# Patient Record
Sex: FEMALE | Race: WHITE
[De-identification: names, ages, dates, MRNs, and addresses within clinical notes are randomized per-mention and may not be internally consistent; named-entity substitution may affect disease eponyms.]

---

## 2018-11-04 ENCOUNTER — HOSPITAL ENCOUNTER (EMERGENCY)
Dept: HOSPITAL 97 - ER | Age: 28
Discharge: HOME | End: 2018-11-04
Payer: SELF-PAY

## 2018-11-04 VITALS — TEMPERATURE: 97.9 F

## 2018-11-04 VITALS — OXYGEN SATURATION: 98 % | SYSTOLIC BLOOD PRESSURE: 102 MMHG | DIASTOLIC BLOOD PRESSURE: 87 MMHG

## 2018-11-04 DIAGNOSIS — J45.901: Primary | ICD-10-CM

## 2018-11-04 PROCEDURE — 94640 AIRWAY INHALATION TREATMENT: CPT

## 2018-11-04 PROCEDURE — 99284 EMERGENCY DEPT VISIT MOD MDM: CPT
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
## 2018-11-04 NOTE — EDPHYS
Physician Documentation                                                                           

 [facility]                                                                

Name: Lopez Oquendo                                                                               

Age: 28 yrs                                                                                       

Sex: Female                                                                                       

: 1990                                                                                   

MRN: [id number]                                                                                   

Arrival Date: 2018                                                                          

Time: 06:36                                                                                       

Account#: [id number]                                                                            

Bed 6                                                                                             

Private MD:                                                                                       

ED Physician Olvin Yousif                                                                       

HPI:                                                                                              

                                                                                             

07:00 This 28 yrs old  Female presents to ER via Ambulatory with complaints of       cp  

      Breathing Difficulty, Asthma Exacerbation.                                                  

07:00 The patient has shortness of breath at rest.                                            cp  

07:00 Onset: The symptoms/episode began/occurred 3 day(s) ago.                                cp  

07:00 Duration: The symptoms are continuous, and are steadily getting worse. Associated signs cp  

      and symptoms: Pertinent negatives: chest pain, productive cough, fever, vomiting.           

07:00 Severity of symptoms: in the emergency department the symptoms are unchanged despite    cp  

      home interventions.                                                                         

                                                                                                  

OB/GYN:                                                                                           

06:52 LMP 2018                                                                           ea  

                                                                                                  

Historical:                                                                                       

- Allergies:                                                                                      

06:52 No Known Allergies;                                                                     ea  

- Home Meds:                                                                                      

06:52 Advair Diskus Inhl [Active]; Flovent Inhl [Active]; Albuterol Nebulizer [Active];       ea  

- PMHx:                                                                                           

06:52 Asthma;                                                                                 ea  

- PSHx:                                                                                           

06:52 None;                                                                                   ea  

                                                                                                  

- Immunization history:: Adult Immunizations up to date.                                          

- Social history:: Smoking status: Patient/guardian denies using tobacco.                         

- Ebola Screening: : No symptoms or risks identified at this time.                                

                                                                                                  

                                                                                                  

ROS:                                                                                              

07:05 Constitutional: Negative for body aches, chills, fever, poor PO intake.                 cp  

07:05 Eyes: Negative for injury, pain, redness, and discharge.                                cp  

07:05 ENT: Negative for drainage from ear(s), ear pain, sore throat, difficulty swallowing,       

      difficulty handling secretions.                                                             

07:05 Cardiovascular: Negative for chest pain, edema, palpitations.                               

07:05 Respiratory: Positive for cough, with no reported sputum, shortness of breath, wheezing.    

07:05 Abdomen/GI: Negative for abdominal pain, nausea, vomiting, and diarrhea.                    

07:05 Back: Negative for pain at rest, pain with movement, radiated pain.                         

07:05 : Negative for urinary symptoms.                                                          

07:05 Skin: Negative for cellulitis, rash.                                                        

07:05 Neuro: Negative for altered mental status, dizziness, headache, syncope, near syncope,      

      weakness.                                                                                   

07:05 All other systems are negative.                                                             

                                                                                                  

Exam:                                                                                             

07:10 Constitutional: The patient appears in no acute distress, alert, awake, non-toxic, well cp  

      developed, well nourished.                                                                  

07:10 Head/Face:  Normocephalic, atraumatic. Eyes:  Pupils equal round and reactive to light, cp  

      extra-ocular motions intact.  Lids and lashes normal.  Conjunctiva and sclera are           

      non-icteric and not injected.  Cornea within normal limits.  Periorbital areas with no      

      swelling, redness, or edema. ENT:  Nares patent. No nasal discharge, no septal              

      abnormalities noted.  Tympanic membranes are normal and external auditory canals are        

      clear.  Oropharynx with no redness, swelling, or masses, exudates, or evidence of           

      obstruction, uvula midline.  Mucous membranes moist. Neck:  Trachea midline, no             

      thyromegaly or masses palpated, and no cervical lymphadenopathy.  Supple, full range of     

      motion without nuchal rigidity, or vertebral point tenderness.  No Meningismus.             

      Chest/axilla:  Normal chest wall appearance and motion.  Nontender with no deformity.       

      No lesions are appreciated.                                                                 

07:10 Cardiovascular: Rate: tachycardic, Rhythm: regular, Heart sounds: murmur, not               

      appreciated, Edema: is not appreciated.                                                     

07:10 Respiratory: the patient does not display signs of respiratory distress,  Respirations:     

      labored breathing, is not present, intercostal retractions, are absent, splinting, is       

      not noted, tachypnea, is not appreciated, Breath sounds: decreased breath sounds, are       

      not appreciated, stridor, is not appreciated, wheezing: that is mild, is heard              

      diffusely.                                                                                  

07:10 Abdomen/GI: Exam negative for discomfort, distension, guarding, Inspection: abdomen         

      appears normal.                                                                             

07:10 Back: pain, is absent, ROM is normal.                                                       

07:10 Skin: cellulitis, is not appreciated, no rash present.                                      

07:10 Neuro: Orientation: to person, place \T\ time. Mentation: is normal, Cerebellar function:   

      is grossly normal, Motor: moves all fours, strength is normal, Sensation: no obvious        

      gross deficits, Gait: is steady, at a normal pace, without difficulty.                      

                                                                                                  

Vital Signs:                                                                                      

06:52  / 93; Pulse 107; Resp 22; Temp 97.9(O); Pulse Ox 97% ; Weight 56.7 kg; Height 5  ea  

      ft. (152.40 cm); Pain 0/10;                                                                 

08:00  / 84; Pulse 99; Resp 17; Pulse Ox 96% on R/A;                                    tw2 

08:45  / 87; Pulse 89; Resp 20; Pulse Ox 98% on R/A;                                    ph  

06:52 Body Mass Index 24.41 (56.70 kg, 152.40 cm)                                             ea  

                                                                                                  

MDM:                                                                                              

06:52 Patient medically screened.                                                             cp  

07:00 Differential diagnosis: asthma, Bronchitis Chronic Obstructive Pulmonary Disease        cp  

      pneumonia, Pneumothorax pulmonary edema, Pulmonary Embolism reactive airway disease.        

08:39 Data reviewed: vital signs, nurses notes, and as a result, I will discharge patient.    cp  

08:39 Response to treatment: the patient's symptoms have markedly improved after treatment,   cp  

      VSS. Wheezing resolved and cough improved, and as a result, I will discharge patient.       

                                                                                                  

Administered Medications:                                                                         

07:07 Drug: predniSONE 10 mg Route: PO;                                                       rb1 

08:51 Follow up: Response: No adverse reaction; Marked relief of symptoms                     ph  

07:08 Drug: Albuterol - atroVENT (3:1) (2.5 mg - 0.5 mg) 3 ml Route: Nebulizer;               rb1 

08:51 Follow up: Response: No adverse reaction; Marked relief of symptoms; Wheezing diminishedph  

07:08 Drug: predniSONE 40 mg Route: PO;                                                       rb1 

08:51 Follow up: Response: No adverse reaction; Marked relief of symptoms                     ph  

                                                                                                  

                                                                                                  

Disposition:                                                                                      

10:48 Co-signature as Attending Physician, Olvin Yousif MD I agree with the assessment and   kdr 

      plan of care.                                                                               

                                                                                                  

Disposition:                                                                                      

18 08:45 Discharged to Home. Impression: Unspecified asthma with (acute) exacerbation.      

- Condition is Stable.                                                                            

- Discharge Instructions: Asthma, Adult.                                                          

- Prescriptions for Advair HFA 45- 21 mcg/actuation Inhalation HFA aerosol inhaler -              

  inhale 2 puff by INHALATION route 2 times per day; 1 Inhaler. Albuterol Sulfate 2.5             

  mg /3 mL (0.083 %) Inhalation Solution for Nebulization - inhale 1 unit by                      

  NEBULIZATION route every 8 hours As needed; 1 box. Prednisone 20 mg Oral Tablet -               

  take 2 tablet by ORAL route once daily for 5 days; 10 tablet. Albuterol Sulfate 90              

  mcg/actuation - inhale 1-2 puff by INHALATION route every 4-6 hours; 1 Inhaler.                 

- Medication Reconciliation Form, Thank You Letter, Antibiotic Education, Prescription            

  Opioid Use form.                                                                                

- Follow up: Private Physician; When: 1 - 2 days; Reason: Recheck today's complaints.             

- Problem is an acute exacerbation.                                                               

- Symptoms have improved.                                                                         

                                                                                                  

                                                                                                  

                                                                                                  

Signatures:                                                                                       

Olvin Yousif MD MD kdr Ballard, Brenda, RN                     RN   bb                                                   

Joan Guerra RN RN   ph                                                   

Bart Morel PA PA cp Barber, Rebecca, RN                     RN   [facility]                                                  

Lucrecia Dumont RN RN ea                                                   

                                                                                                  

Corrections: (The following items were deleted from the chart)                                    

08:54 08:45 2018 08:45 Discharged to Home. Impression: Unspecified asthma with (acute)  ph  

      exacerbation. Condition is Stable. Forms are Medication Reconciliation Form, Thank You      

      Letter, Antibiotic Education, Prescription Opioid Use. Follow up: Private Physician;        

      When: 1 - 2 days; Reason: Recheck today's complaints. Problem is an acute exacerbation.     

      Symptoms have improved. cp                                                                  

                                                                                                  

**************************************************************************************************

## 2018-11-04 NOTE — XMS REPORT
[facility]

 Created on:2018



Patient:Lopez Oquendo

Sex:Female

:1990

External Reference #:[id number]





Demographics







 Address  [address] [place] [address]

 

 Phone  [phone]

 

 Preferred Language  en

 

 Marital Status  Unknown

 

 Baptism Affiliation  Unknown

 

 Race  White

 

 Ethnic Group  Unknown









Author







 Organization  [employer]









Care Team Providers







 Name  Role  Phone

 

 Braeden Stephenson  Provider Role  Unavailable









Allergies

No Known Allergies



Problems







 Problem Type  Condition  Code  Onset Dates  Condition Status

 

 Problem  Asthma, unspecified asthma  J45.909    Active



   severity, unspecified whether      



   complicated, unspecified whether      



   persistent      

 

 Problem  Seasonal allergies  J30.2    Active







Medications

No Known Medications



Results

No Known Results



Summary Purpose

[employer] Submission

## 2018-11-04 NOTE — XMS REPORT
Clinical Summary

 Created on:2018



Patient:Lopez Oquendo

Sex:Female

:1990

External Reference #:[id number]





Demographics







 Address  [address]



   [place] [address]

 

 Mobile Phone  [phone]

 

 Home Phone  [phone]

 

 Email Address  [email or web address]

 

 Preferred Language  English

 

 Marital Status  Single

 

 Temple Affiliation  Unknown

 

 Race  White

 

 Ethnic Group  Not  or 









Author







 Organization  [facility]

 

 Address  [address] [place] [address]









Support







 Name  Relationship  Address  Phone

 

 Rose Shay  Parent  [address]  [phone]



     [place] [address]  









Care Team Providers







 Name  Role  Phone

 

 Asked, No Pcp  Primary Care Provider  Unavailable









Allergies

No Known Allergies



Current Medications







 Prescription  Sig.  Disp.  Refills  Start Date  End Date  Status

 

 etodolac (LODINE) 500  Take 1 tablet  20 tablet  0  2017  




 MG tablet  (500 mg total)          



   by mouth 2          



   (two) times a          



   day for 10          



   days.          







Active Problems

Not on file



Encounters







 Date  Type  Specialty  Care Team  Description

 

 2017  Emergency  Emergency Medicine  Elicia Cowan,  Contusion of 
right great



       NP-C



  toe without damage to



       Stephen Simms MD  nail, initial encounter



         (Primary Dx)



after 2017



Social History







 Tobacco Use  Types  Packs/Day  Years Used  Date

 

 Never Smoker        









 Alcohol Use  Drinks/Week  oz/Week  Comments

 

 No      









 Sex Assigned at Birth  Date Recorded

 

 Not on file  







Last Filed Vital Signs







 Vital Sign  Reading  Time Taken

 

 Blood Pressure  127/75  2017  8:32 PM CST

 

 Pulse  107  2017  8:32 PM CST

 

 Temperature  36.9 C (98.4 F)  2017  8:32 PM CST

 

 Respiratory Rate  16  2017  8:32 PM CST

 

 Oxygen Saturation  98%  2017  8:32 PM CST

 

 Inhaled Oxygen Concentration  -  -

 

 Weight  53.1 kg (117 lb)  2017  8:30 PM CST

 

 Height  152.4 cm (5')  2017  8:30 PM CST

 

 Body Mass Index  22.85  2017  8:30 PM CST







Plan of Treatment







 Health Maintenance  Due Date  Last Done  Comments

 

 CERVICAL CANCER SCREENING  2013    

 

 INFLUENZA VACCINE  2018    







Procedures







 Procedure Name  Priority  Date/Time  Associated Diagnosis  Comments

 

 XR FOOT 3+ VW RIGHT  STAT  2017  9:14 PM    Results for this



     CST    procedure are in



         the results



         section.



after 2017



Results

XR Foot 3+ Vw Right (2017  9:14 PM)





 Narrative  Performed At

 

 EXAMINATION:XR FOOT 3VW RIGHT



   [facility]



  



  



 CLINICAL HISTORY:great toe trauna



  



  



  



 COMPARISON:None.



  



  



  



 IMPRESSION:



  



  



  



 1.There is no evidence of acute right foot fracture or dislocation.  



 There are no radiopaque foreign bodies.



  



 2.There are no focal bony erosions or periostitis.



  



  



  



 [facility]-[id number]



  



   









 Procedure Note

 

  Interface, Radiology Results Incoming - 2017  9:20 PM CST



EXAMINATION:  XR FOOT 3  VW RIGHT



 



 CLINICAL HISTORY:  great toe trauna



 



 COMPARISON:  None.



 



 IMPRESSION:



 



 1.  There is no evidence of acute right foot fracture or dislocation. There 
are no radiopaque foreign bodies.



 2.  There are no focal bony erosions or periostitis.



 



 [facility]-[id number]









 Performing Organization  Address  City/State/Zipcode  Phone Number

 

  [facility]  [address] [place] [address]  



after 2017

## 2018-11-04 NOTE — ER
Nurse's Notes                                                                                     

 [facility]                                                                

Name: Lopez Oquendo                                                                               

Age: 28 yrs                                                                                       

Sex: Female                                                                                       

: 1990                                                                                   

MRN: [id number]                                                                                   

Arrival Date: 2018                                                                          

Time: 06:36                                                                                       

Account#: [id number]                                                                            

Bed 6                                                                                             

Private MD:                                                                                       

Diagnosis: Unspecified asthma with (acute) exacerbation                                           

                                                                                                  

Presentation:                                                                                     

                                                                                             

06:47 Presenting complaint: Patient states: Pt reports difficulty breathing for the past      ea  

      three days states " I have been using my inhalers because I ran out of my breathing         

      treatments but they haven't helped". Transition of care: patient was not received from      

      another setting of care. Onset of symptoms was 2018. Risk Assessment: Do       

      you want to hurt yourself or someone else? Patient reports no desire to harm self or        

      others. Initial Sepsis Screen: Does the patient meet any 2 criteria? RR > 20 per min.       

      HR > 90 bpm. Yes Does the patient have a suspected source of infection? No. Patient's       

      initial sepsis screen is negative. Care prior to arrival: None.                             

06:47 Method Of Arrival: Ambulatory                                                           ea  

06:47 Acuity: CHLOE 3                                                                           ea  

                                                                                                  

Triage Assessment:                                                                                

06:54 General: Appears uncomfortable, Behavior is calm, cooperative, appropriate for age.     ea  

      Pain: Denies pain. Neuro: Level of Consciousness is awake, alert, obeys commands,           

      Oriented to person, place, time, situation. Cardiovascular: Patient's skin is warm and      

      dry. Respiratory: Reports shortness of breath Breath sounds with wheezes bilaterally.       

      Onset: The symptoms/episode began/occurred 3 days ago, the patient has mild shortness       

      of breath. Derm: Skin is pink, warm \T\ dry.                                                

                                                                                                  

OB/GYN:                                                                                           

06:52 LMP 2018                                                                           ea  

                                                                                                  

Historical:                                                                                       

- Allergies:                                                                                      

06:52 No Known Allergies;                                                                     ea  

- Home Meds:                                                                                      

06:52 Advair Diskus Inhl [Active]; Flovent Inhl [Active]; Albuterol Nebulizer [Active];       ea  

- PMHx:                                                                                           

06:52 Asthma;                                                                                 ea  

- PSHx:                                                                                           

06:52 None;                                                                                   ea  

                                                                                                  

- Immunization history:: Adult Immunizations up to date.                                          

- Social history:: Smoking status: Patient/guardian denies using tobacco.                         

- Ebola Screening: : No symptoms or risks identified at this time.                                

                                                                                                  

                                                                                                  

Screenin:00 Abuse screen: Denies threats or abuse. Nutritional screening: No deficits noted.        rb1 

      Tuberculosis screening: No symptoms or risk factors identified. Fall Risk None              

      identified.                                                                                 

                                                                                                  

Assessment:                                                                                       

07:00 General: Appears in no apparent distress. comfortable, Behavior is calm, cooperative.   rb1 

      General: Denies fever. Pain: Complains of pain in back between the shoulder blades Pain     

      currently is 8 out of 10 on a pain scale. Neuro: Level of Consciousness is awake,           

      alert, obeys commands, Oriented to person, place, time, situation. Cardiovascular:          

      Capillary refill < 3 seconds is brisk in bilateral fingers. Respiratory: Reports            

      shortness of breath at rest Airway is patent Respiratory effort is even, unlabored,         

      Respiratory pattern is regular, symmetrical. GI: No signs and/or symptoms were reported     

      involving the gastrointestinal system. : No signs and/or symptoms were reported           

      regarding the genitourinary system. Derm: Skin is pink, warm \T\ dry.                       

08:54 Reassessment: Patient appears in no apparent distress at this time. Patient and/or      ph  

      family updated on plan of care and expected duration. Pain level reassessed. Patient is     

      alert, oriented x 3, equal unlabored respirations, skin warm/dry/pink. Patient states       

      feeling better. Patient states symptoms have improved.                                      

                                                                                                  

Vital Signs:                                                                                      

06:52  / 93; Pulse 107; Resp 22; Temp 97.9(O); Pulse Ox 97% ; Weight 56.7 kg; Height 5  ea  

      ft. (152.40 cm); Pain 0/10;                                                                 

08:00  / 84; Pulse 99; Resp 17; Pulse Ox 96% on R/A;                                    tw2 

08:45  / 87; Pulse 89; Resp 20; Pulse Ox 98% on R/A;                                    ph  

06:52 Body Mass Index 24.41 (56.70 kg, 152.40 cm)                                             ea  

                                                                                                  

ED Course:                                                                                        

06:36 Patient arrived in ED.                                                                  al2 

06:50 Triage completed.                                                                       ea  

06:52 Bart Morel PA is PHCP.                                                                cp  

06:52 Ubaldo Lloyd MD is Attending Physician.                                                    cp  

07:00 Patient has correct armband on for positive identification. Bed in low position. Call   rb1 

      light in reach. Side rails up X 1. Pulse ox on. NIBP on.                                    

07:00 Arm band placed on right wrist.                                                         rb1 

08:39 Olvin Yousif MD is Attending Physician.                                              cp  

08:44 Guerra, Joan, RN is Primary Nurse.                                                    ph  

08:52 No provider procedures requiring assistance completed. Patient did not have IV access   ph  

      during this emergency room visit.                                                           

                                                                                                  

Administered Medications:                                                                         

07:07 Drug: predniSONE 10 mg Route: PO;                                                       rb1 

08:51 Follow up: Response: No adverse reaction; Marked relief of symptoms                     ph  

07:08 Drug: Albuterol - atroVENT (3:1) (2.5 mg - 0.5 mg) 3 ml Route: Nebulizer;               rb1 

08:51 Follow up: Response: No adverse reaction; Marked relief of symptoms; Wheezing diminishedph  

07:08 Drug: predniSONE 40 mg Route: PO;                                                       rb1 

08:51 Follow up: Response: No adverse reaction; Marked relief of symptoms                     ph  

                                                                                                  

                                                                                                  

Outcome:                                                                                          

08:45 Discharge ordered by MD.                                                                cp  

08:53 Discharged to home ambulatory, with friend.                                             ph  

08:53 Condition: improved                                                                         

08:53 Discharge instructions given to patient, Instructed on discharge instructions, follow       

      up and referral plans. medication usage, Demonstrated understanding of instructions,        

      follow-up care, medications, Prescriptions given X 4.                                       

08:54 Patient left the ED.                                                                    ph  

                                                                                                  

Signatures:                                                                                       

Joan Guerra, RN                      RN   ph                                                   

Bart Morel, Piedad Mauro cp, RN                     RN   rb1                                                  

Emma Lal RN                          RN   tw2                                                  

Lucrecia Dumont, RN                      Maggie Campos ea                                                  

                                                                                                  

**************************************************************************************************

## 2019-02-18 ENCOUNTER — HOSPITAL ENCOUNTER (EMERGENCY)
Dept: HOSPITAL 97 - ER | Age: 29
Discharge: HOME | End: 2019-02-18
Payer: COMMERCIAL

## 2019-02-18 VITALS — SYSTOLIC BLOOD PRESSURE: 130 MMHG | OXYGEN SATURATION: 97 % | DIASTOLIC BLOOD PRESSURE: 77 MMHG

## 2019-02-18 VITALS — TEMPERATURE: 99.3 F

## 2019-02-18 DIAGNOSIS — Z3A.16: ICD-10-CM

## 2019-02-18 DIAGNOSIS — O99.512: Primary | ICD-10-CM

## 2019-02-18 DIAGNOSIS — J45.901: ICD-10-CM

## 2019-02-18 NOTE — XMS REPORT
Clinical Summary

 Created on:2019



Patient:Lopez Oquendo

Sex:Female

:1990

External Reference #:[id number]





Demographics







 Address  [address] [place] [address]

 

 Home Phone  [phone]

 

 Email Address  [email or web address]

 

 Email Address  [email or web address]

 

 Preferred Language  English

 

 Marital Status  Single

 

 Oriental orthodox Affiliation  Unknown

 

 Race  White

 

 Ethnic Group  Not  or 









Author







 Organization  [facility]

 

 Address  [address] [place] [address]









Support







 Name  Relationship  Address  Phone

 

 Rose Shay  Parent  [address]  [phone]



     [place] [address]  









Care Team Providers







 Name  Role  Phone

 

 Asked, No Pcp  Primary Care Provider  Unavailable









Allergies

No Known Allergies



Medications







 Medication  Sig  Dispensed  Refills  Start Date  End Date  Status

 

 PROAIR HFA 90  INHALE 2 PUFFS    0  10/12/2018  2018  Discontinued



 mcg/actuation  BY MOUTH EVERY          



 inhaler  4 HOURS          

 

 predniSONE  Take 20 mg by    0  10/12/2018  2018  Discontinued



 (DELTASONE) 20 mg  mouth daily.          



 tablet            

 

 azithromycin  Take 2 tablets  6 tablet  0  2018  



 (ZITHROMAX Z-PIERRE)  the first day,          



 250 MG tablet  then 1 tablet          



   daily for 4          



   days.          

 

 albuterol (PROAIR  Inhale 1-2  1 Inhaler  0  2018  



 HFA,PROVENTIL  puffs every 6          



 HFA,VENTOLIN HFA)  (six) hours as          



 90 mcg/actuation  needed for          



 inhaler  wheezing for up          



   to 30 days.          

 

 cetirizine  Take 1 tablet  30 tablet  0  2018  



 (ZyrTEC) 10 MG  (10 mg total)          



 tablet  by mouth daily          



   as needed for          



   allergies for          



   up to 30 days.          







Active Problems







 Pregnant  Comments

 

 Yes  





No additional problems on file



Encounters







 Date  Type  Specialty  Care Team  Description

 

 2018  Emergency  Emergency Medicine  Pawan Delgadillo,  Moderate 
asthma with acute exacerbation, unspecified whether persistent (Primary Dx);



       NP-C



  Pregnancy, unspecified gestational age



       Hemanth Mendez MD  

 

 2018  Travel      



after 2018



Immunizations







 Name  Dates Previously Given  Next Due

 

 FLUCELVAX QUAD PF (0.5mL syringe)  2018  







Social History







 Tobacco Use  Types  Packs/Day  Years Used  Date

 

 Never Smoker        









 Smokeless Tobacco: Never Used      









 Alcohol Use  Drinks/Week  oz/Week  Comments

 

 No      









 Alcohol Habits  Answer  Date Recorded

 

 How often do you have a drink containing alcohol?  Never  2018

 

 How many drinks containing alcohol do you have on a typical  Not asked  2018



 day when you are drinking?    

 

 How often do you have six or more drinks on one occasion?  Not asked  2018









 Pregnant  Comments

 

 Yes  









 Sex Assigned at Birth  Date Recorded

 

 Not on file  









 Job Start Date  Occupation  Industry

 

 Not on file  Not on file  Not on file









 Travel History  Travel Start  Travel End









 No recent travel history available.







Last Filed Vital Signs







 Vital Sign  Reading  Time Taken

 

 Blood Pressure  119/73  2018 10:00 PM CST

 

 Pulse  121  2018 10:00 PM CST

 

 Temperature  36.8 C (98.2 F)  2018  6:08 PM CST

 

 Respiratory Rate  15  2018 10:00 PM CST

 

 Oxygen Saturation  98%  2018 10:00 PM CST

 

 Inhaled Oxygen Concentration  -  -

 

 Weight  55.3 kg (122 lb)  2018  5:47 PM CST

 

 Height  152.4 cm (5')  2018  5:47 PM CST

 

 Body Mass Index  23.83  2018  5:47 PM CST







Plan of Treatment







 Health Maintenance  Due Date  Last Done  Comments

 

 CERVICAL CANCER SCREENING  2013    

 

 INFLUENZA VACCINE  Completed  2018  







Procedures







 Procedure Name  Priority  Date/Time  Associated  Comments



       Diagnosis  

 

 ECG ED PRELIMINARY  Routine  2018  8:10    Results for this



 INTERPRETATION    PM CST    procedure are in



         the results



         section.

 

 XR CHEST 1 VW PORTABLE  STAT  2018  7:03    Results for this



     PM CST    procedure are in



         the results



         section.

 

 HCG QUALITATIVE, URINE  STAT  2018  6:27    Results for this



 SCREEN    PM CST    procedure are in



         the results



         section.

 

 ESTIMATED GFR  STAT  2018  6:14    Results for this



     PM CST    procedure are in



         the results



         section.

 

 COMPREHENSIVE METABOLIC  STAT  2018  6:14    Results for this



 PANEL    PM CST    procedure are in



         the results



         section.

 

 HC COMPLETE BLD COUNT  STAT  2018  6:14    Results for this



 W/AUTO DIFF    PM CST    procedure are in



         the results



         section.

 

 ECG 12-LEAD  Routine  2018  5:59    Results for this



     PM CST    procedure are in



         the results



         section.



after 2018



Results

ECG ED Preliminary Interpretation - Not an Order (2018  8:10 PM CST)





 Narrative  Performed At

 

 Pawan Delgadillo NP-C 20181:35 AM



  



 ECG ED Preliminary Interpretation - Not an Order



  



 Performed by: Pawan Delgadillo NP-C



  



 Authorized by: Pawan Delgadillo NP-C 



  



  



  



 ECG reviewed by ED Physician in the absence of a cardiologist: yes



  



 Previous ECG: 



  



 Previous ECG:Unavailable



  



 Interpretation: 



  



 Interpretation: non-specific



  



 Rate: 



  



 ECG rate:115



  



 ECG rate assessment: tachycardic



  



 Rhythm: 



  



 Rhythm: sinus tachycardia



  



 Ectopy: 



  



 Ectopy: none



  



 QRS: 



  



 QRS axis:Normal



  



 QRS intervals:Normal



  



 ST segments: 



  



 ST segments:Normal



  



 T waves: 



  



 T waves: non-specific



  



 Comments: 



  



  Inferior infarct age undetermined.  



XR Chest 1 Vw Portable (2018  7:03 PM CST)





 Narrative  Performed At

 

 EXAMINATION:XR CHEST 1 VW PORTABLE



   [facility]



  



  



 CLINICAL HISTORY:worsening coughfever



  



  



  



 COMPARISON:None.



  



  



  



 IMPRESSION: Single frontal view reveals a normal cardiomediastinal  



 silhouette. Lungs are clear. Pleural margins are sharp. The remainder of  



 the examination is unremarkable.



  



  



  



  



  



  



  



 [facility]-[id number]



  



   









 Procedure Note

 

 Hm Interface, Radiology Results Incoming - 2018  7:09 PM CST



EXAMINATION:  XR CHEST 1 VW PORTABLE



 



 CLINICAL HISTORY:  worsening cough  fever



 



 COMPARISON:  None.



 



 IMPRESSION: Single frontal view reveals a normal cardiomediastinal silhouette. 
Lungs are clear. Pleural margins are sharp. The remainder of the examination is 
unremarkable.



 



 



 



 [facility]-[id number]









 Performing Organization  Address  City/[facility]/Zipcode  Phone Number

 

  [facility]  [address] [place] [address]  



hCG qualitative, urine screen (2018  6:27 PM CST)





 Component  Value  Ref Range  Performed At

 

 hCG qualitative, urine  Positive  Negative  [facility]



   Comment:    [facility]



   The manufacturers stated sensitivity of HcG test for serum is >/=10    



   mIU/ml and urine is >/=20mIU/ml.    



       









 Specimen

 

 Urine









 Performing Organization  Address  City/[facility]/Zipcode  Phone Number

 

 [facility] DEPARTMENT OF PATHOLOGY AND  [address] [place] [address]  



 GENOMIC MEDICINE      

 

 [facility]  [address] [place] [address]  



 HOSPITAL      



Estimated GFR (2018  6:14 PM CST)





 Component  Value  Ref Range  Performed At

 

 Estimated GFR  >=90  mL/min/1.73 m2  [facility]



   Comment:    [facility]



   CatergoryUnitsInterpretation    



   G1 >=90 Normal or high    



   G2 60-89Mildly decreased    



   L1t95-64Xbmtrm to moderately decreased    



   G5q56-58Hbkoofzrny to severely decreased    



   G4 15-29Severely decreased    



   G5 <15Kidney failure    



   The eGFR was calculated using the Chronic Kidney Disease    



   Epidemiology Collaboration (CKD-EPI) equation.    



   Interpretation is based on recommendations of the    



   National Kidney Foundation-Kidney Disease Outcomes Quality    



   Initiative (NKF-KDOQI) published in 2014.    



       









 Specimen

 

 Plasma specimen









 Performing Organization  Address  City/State/Zipcode  Phone Number

 

 [facility]  [address] [place] [address]  



 GENOMIC MEDICINE      

 

 [facility]  [address] [place] [address]  



 [facility]      



CBC with platelet and differential (2018  6:14 PM CST)





 Component  Value  Ref Range  Performed At

 

 WBC  9.96  4.50 - 11.00 k/uL  [facility]

 

 RBC  4.73  4.20 - 5.50 m/uL  [facility]

 

 HGB  14.6  12.0 - 16.0 g/dL  [facility]

 

 HCT  42.9  37.0 - 47.0 %  [facility]

 

 MCV  90.7  82.0 - 100.0 fL  [facility]

 

 MCH  30.9  27.0 - 34.0 pg  [facility]

 

 MCHC  34.0  31.0 - 37.0 g/dL  [facility]

 

 RDW - SD  39.6  37.0 - 55.0 fL  [facility]

 

 MPV  9.0  8.8 - 13.2 fL  [facility]

 

 Platelet count  306  150 - 400 k/uL  [facility]

 

 Nucleated RBC  0.00  /100 WBC  [facility]

 

 Neutrophils  79.8 (H)  39.0 - 69.0 %  [facility]

 

 Lymphocytes  6.5 (L)  25.0 - 45.0 %  [facility]

 

 Monocytes  8.7  0.0 - 10.0 %  [facility]

 

 Eosinophils  4.0  0.0 - 5.0 %  [facility]

 

 Basophils  0.8  0.0 - 1.0 %  [facility]









 Specimen

 

 Blood









 Performing Organization  Address  [facility]  Phone Number

 

 [facility] DEPARTMENT OF PATHOLOGY AND  [address] [place] [address]  



 GENOMIC MEDICINE      

 

 [facility]  [address] [place] [address]  



 [facility]      



Comprehensive metabolic panel (2018  6:14 PM CST)





 Component  Value  Ref Range  Performed At

 

 Sodium  138  135 - 148 mEq/L  [facility]

 

 Potassium  3.4 (L)  3.5 - 5.0 mEq/L  [facility]

 

 Chloride  101  98 - 112 mEq/L  [facility]

 

 CO2  22 (L)  24 - 31 mEq/L  [facility]

 

 Anion gap  15@ANIO  7 - 15 mEq/L  [facility]

 

 BUN  6  6 - 20 mg/dL  [facility]

 

 Creatinine  0.70  0.50 - 0.90 mg/dL  [facility]

 

 Glucose  98  65 - 99 mg/dL  [facility]

 

 Calcium  10.1  8.3 - 10.2 mg/dL  [facility]

 

 Protein  8.4 (H)  6.3 - 8.3 g/dL  [facility]



   Comment:    [facility]



    4.6-7.0 g/dL    



   1 week 4.4-7.6 g/dL    



   7 months-1year5.1-7.3 g/dL    



   1-2 years5.6-7.5 g/dL    



   >3 years6.0-8.0 g/dL    



    6.3-8.3 g/dL    



       

 

 Albumin  5.0  3.5 - 5.0 g/dL  [facility]

 

 A/G ratio  1.5  0.7 - 3.8  [facility]

 

 Alkaline phosphatase  59  35 - 104 U/L  [facility]

 

 AST  52 (H)  10 - 35 U/L  [facility]

 

 ALT  40  5 - 50 U/L  [facility]

 

 Total bilirubin  0.3  0.0 - 1.2 mg/dL  [facility]









 Specimen

 

 Plasma specimen









 Performing Organization  Address  City/[facility]/Zipcode  Phone Number

 

 [facility]  [address] [place] [address]  



 GENOMIC MEDICINE      

 

 [facility]  [address] [place] [address]  



 [facility]      



ECG 12 lead (2018  5:59 PM CST)





 Component  Value  Ref Range  Performed At

 

 Ventricular rate  115    [facility]

 

 Atrial rate  115    [facility]

 

 AL interval  138    [facility]

 

 QRSD interval  74    [facility]

 

 QT interval  304    [facility]

 

 QTC interval  420    [facility]

 

 P axis 1  -21    [facility]

 

 QRS axis 1  -15    [facility]

 

 T wave axis  -22    [facility]

 

 EKG impression  Sinus tachycardia-Inferior infarct , age    [facility]



   undetermined-Abnormal ECG-No previous ECGs    



   available-Electronically Signed By Michael Rodriguez MD ([id number]) on 2018 7:04:32 AM    









 Narrative  Performed At

 

 This result has an attachment that is not available.



  









 Performing Organization  Address  City/State/Zipcode  Phone Number

 

 [facility]  [address] [place] [address]  



after 2018



Insurance







 Payer  Benefit Plan / Group  Subscriber ID  Type  Phone  Address

 

 MEDICAID  MEDICAID  xxxxxxxxx  Medicaid    

 

 MEDICAID  MEDICAID  [id number]  Medicaid    







Advance Directives

Patient has advance care planning documents on file. For more information, 
please contact:Donte Barnes[address] [place] [address]
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
## 2019-02-18 NOTE — XMS REPORT
[facility]

 Created on:2018



Patient:Lopez Oquendo

Sex:Female

:1990

External Reference #:[id number]





Demographics







 Address  [address] [place] [address]

 

 Phone  [phone]

 

 Preferred Language  en

 

 Marital Status  Unknown

 

 Hoah[employer]ism Affiliation  Unknown

 

 Race  White

 

 Ethnic Group  Unknown









Author







 Organization  [employer]









Care Team Providers







 Name  Role  Phone

 

 Braeden Stephenson  Provider Role  Unavailable









Allergies

No Known Allergies



Problems







 Problem Type  Condition  Code  Onset Dates  Condition Status

 

 Problem  Asthma, unspecified asthma  J45.909    Active



   severity, unspecified whether      



   complicated, unspecified whether      



   persistent      

 

 Problem  Seasonal allergies  J30.2    Active







Medications

No Known Medications



Results

No Known Results



Summary Purpose

[employer] Submission

## 2019-02-18 NOTE — EDPHYS
Physician Documentation                                                                           

 [facility]                                                                

Name: Lopez Oquendo                                                                               

Age: 28 yrs                                                                                       

Sex: Female                                                                                       

: 1990                                                                                   

MRN: [id number]                                                                                   

Arrival Date: 2019                                                                          

Time: 10:56                                                                                       

Account#: [id number]                                                                            

Bed 23                                                                                            

Private MD:                                                                                       

ED Physician Bart Angelo                                                                      

HPI:                                                                                              

                                                                                             

13:20 This 28 yrs old  Female presents to ER via Ambulatory with complaints of       kb  

      Asthma Exacerbation, 16 weeks pregnant.                                                     

13:20 The patient presents to the emergency department with wheezing, Current therapy:        kb  

      albuterol inhaler, albuterol nebs. Onset: The symptoms/episode began/occurred 2 day(s)      

      ago.                                                                                        

13:21 Modifying factors: The symptoms are alleviated by nothing, the symptoms are aggravated  kb  

      by nothing. Associated signs and symptoms: The patient has no apparent associated signs     

      or symptoms. Severity of symptoms: At their worst the symptoms were moderate severe in      

      the emergency department the symptoms are unchanged. The patient has experienced            

      similar episodes in the past, multiple times. The patient has not recently seen a           

      physician.                                                                                  

                                                                                                  

OB/GYN:                                                                                           

11:03 LMP 10/17/2018                                                                          aa5 

                                                                                                  

Historical:                                                                                       

- Allergies:                                                                                      

11:03 No Known Allergies;                                                                     aa5 

- Home Meds:                                                                                      

11:03 Advair Diskus Inhl [Active]; Albuterol Inhl [Active]; Flovent Inhl [Active]; Albuterol  aa5 

      Nebulizer [Active];                                                                         

- PMHx:                                                                                           

11:03 Asthma;                                                                                 aa5 

- PSHx:                                                                                           

11:03 None;                                                                                   aa5 

                                                                                                  

- Immunization history:: Flu vaccine is up to date.                                               

- Ebola Screening: : No symptoms or risks identified at this time.                                

- Social history:: Smoking status: Patient/guardian denies using tobacco.                         

                                                                                                  

                                                                                                  

ROS:                                                                                              

13:15 Constitutional: Negative for fever, chills, and weight loss, ENT: Negative for injury,  kb  

      pain, and discharge, Neck: Negative for injury, pain, and swelling, Cardiovascular:         

      Negative for chest pain, palpitations, and edema, Abdomen/GI: Negative for abdominal        

      pain, nausea, vomiting, diarrhea, and constipation, : Negative for injury, bleeding,      

      discharge, and swelling, MS/Extremity: Negative for injury and deformity, Skin:             

      Negative for injury, rash, and discoloration, Neuro: Negative for headache, weakness,       

      numbness, tingling, and seizure.                                                            

13:15 Respiratory: Positive for shortness of breath, wheezing.                                    

                                                                                                  

Exam:                                                                                             

13:15 Constitutional:  This is a well developed, well nourished patient who is awake, alert,  kb  

      and in no acute distress. Head/Face:  Normocephalic, atraumatic. Chest/axilla:  Normal      

      chest wall appearance and motion.  Nontender with no deformity.  No lesions are             

      appreciated. Cardiovascular:  Regular rate and rhythm with a normal S1 and S2.  No          

      gallops, murmurs, or rubs.  Normal PMI, no JVD.  No pulse deficits. Abdomen/GI:  Soft,      

      non-tender, with normal bowel sounds.  No distension or tympany.  No guarding or            

      rebound.  No evidence of tenderness throughout. Back:  No spinal tenderness.  No            

      costovertebral tenderness.  Full range of motion. Skin:  Warm, dry with normal turgor.      

      Normal color with no rashes, no lesions, and no evidence of cellulitis. MS/ Extremity:      

      Pulses equal, no cyanosis.  Neurovascular intact.  Full, normal range of motion. Neuro:     

       Awake and alert, GCS 15, oriented to person, place, time, and situation.  Cranial          

      nerves II-XII grossly intact.  Motor strength 5/5 in all extremities.  Sensory grossly      

      intact.  Cerebellar exam normal.  Normal gait.                                              

13:15 Respiratory: mild respiratory distress is noted, moderate respiratory distress is           

      noted,  Respirations: labored breathing, Breath sounds: wheezing: expiratory that is        

      severe, is heard diffusely.                                                                 

                                                                                                  

Vital Signs:                                                                                      

11:03  / 79; Pulse 128; Resp 30 S; Pulse Ox 90% on R/A;                                 aa5 

11:29 Pulse 110; Resp 26 S; Temp 99.3(TE); Pulse Ox 98% on Nebulizer Mask;                    aa5 

11:40  / 85; Pulse 128; Resp 22 S; Pulse Ox 95% on R/A;                                 aa5 

13:14  / 94; Pulse 118; Resp 23; Pulse Ox 99% on R/A;                                   ca1 

13:30  / 77; Pulse 118; Resp 31; Pulse Ox 97% on R/A;                                   ca1 

11:40 NP notified of VS                                                                       aa5 

                                                                                                  

MDM:                                                                                              

11:07 Patient medically screened.                                                             kb  

13:14 Data reviewed: vital signs, nurses notes. Data interpreted: Pulse oximetry: on room air kb  

      is 97 %. Interpretation: normal. Counseling: I had a detailed discussion with the           

      patient and/or guardian regarding: the historical points, exam findings, and any            

      diagnostic results supporting the discharge/admit diagnosis, the need for outpatient        

      follow up, a family practitioner, to return to the emergency department if symptoms         

      worsen or persist or if there are any questions or concerns that arise at home.             

13:19 ED course: wheezing decreased after first round of treatment, will repeat neb.          kb  

13:24 ED course: Lungs clear bilaterally, resp even and unlabored. Pt reports she feels much  kb  

      better now. .                                                                               

                                                                                                  

                                                                                             

11:13 Order name: IV Start; Complete Time: 11:22                                              kb  

                                                                                             

11:25 Order name: FHT's; Complete Time: 12:06                                                 kb  

                                                                                                  

Administered Medications:                                                                         

11:12 Drug: DuoNeb (3:1) (2.5 mg - 0.5 mg) 3 ml Route: Nebulizer;                             aa5 

11:34 Follow up: Response: No adverse reaction                                                aa5 

13:00 Follow up: Response: No adverse reaction; Marked relief of symptoms                     ca1 

11:15 Drug: Magnesium Sulfate 2 grams Route: IVPB; Infused Over: 2 hrs; Site: right           aa5 

      antecubital;                                                                                

11:40 Follow up: Response: No adverse reaction; IV Status: Infusion continued                 aa5 

13:00 Follow up: Response: No adverse reaction; IV Status: Completed infusion                 ca1 

11:15 Drug: predniSONE 40 mg Route: PO;                                                       aa5 

11:34 Follow up: Response: No adverse reaction                                                aa5 

13:00 Follow up: Response: No adverse reaction; Marked relief of symptoms                     ca1 

12:44 Drug: DuoNeb (3:1) (2.5 mg - 0.5 mg) 3 ml Route: Nebulizer;                             aa5 

13:00 Follow up: Response: No adverse reaction; Marked relief of symptoms                     ca1 

                                                                                                  

                                                                                                  

Disposition:                                                                                      

                                                                                             

07:15 Co-signature as Attending Physician, Bart Angelo MD I agree with the assessment and  reddy 

      plan of care.                                                                               

                                                                                                  

Disposition:                                                                                      

19 13:25 Discharged to Home. Impression: Unspecified asthma with (acute) exacerbation.      

- Condition is Stable.                                                                            

- Discharge Instructions: Asthma, Adult, Easy-to-Read.                                            

- Prescriptions for Prednisone 20 mg Oral Tablet - take 1 tablet by ORAL route once               

  daily for 5 days; 5 tablet.                                                                     

- Medication Reconciliation Form, Thank You Letter, Antibiotic Education, Prescription            

  Opioid Use form.                                                                                

- Follow up: Emergency Department; When: As needed; Reason: Worsening of condition.               

  Follow up: Private Physician; When: 2 - 3 days; Reason: Recheck today's complaints,             

  Continuance of care, Re-evaluation by your physician.                                           

                                                                                                  

                                                                                                  

                                                                                                  

Signatures:                                                                                       

Leah Carmen FNP-C FNP-Bart Gagnon MD MD cha Calderon, Audri, RN                     RN   aa5                                                  

Britt Roe RN                        RN   ca1                                                  

                                                                                                  

Corrections: (The following items were deleted from the chart)                                    

                                                                                             

13:42 13:25 2019 13:25 Discharged to Home. Impression: Unspecified asthma with (acute)  ca1 

      exacerbation. Condition is Stable. Forms are Medication Reconciliation Form, Thank You      

      Letter, Antibiotic Education, Prescription Opioid Use. Follow up: Emergency Department;     

      When: As needed; Reason: Worsening of condition. Follow up: Private Physician; When: 2      

      - 3 days; Reason: Recheck today's complaints, Continuance of care, Re-evaluation by         

      your physician. kb                                                                          

                                                                                                  

**************************************************************************************************

## 2019-02-18 NOTE — ER
Nurse's Notes                                                                                     

 [facility]                                                                

Name: Lopez Oquendo                                                                               

Age: 28 yrs                                                                                       

Sex: Female                                                                                       

: 1990                                                                                   

MRN: [id number]                                                                                   

Arrival Date: 2019                                                                          

Time: 10:56                                                                                       

Account#: [id number]                                                                            

Bed 23                                                                                            

Private MD:                                                                                       

Diagnosis: Unspecified asthma with (acute) exacerbation                                           

                                                                                                  

Presentation:                                                                                     

                                                                                             

11:03 Presenting complaint: Patient states: cough and SOB x 2 days ago, pt reports symptoms   aa5 

      got worse last night. Pt also reports chest pressure.                                       

11:03 Transition of care: patient was not received from another setting of care. Onset of     aa5 

      symptoms was 2019. Risk Assessment: Do you want to hurt yourself or someone        

      else? Patient reports no desire to harm self or others. Care prior to arrival: None.        

11:03 Method Of Arrival: Ambulatory                                                           aa5 

11:03 Acuity: CHLOE 2                                                                           aa5 

11:10 Initial Sepsis Screen: Does the patient meet any 2 criteria? RR > 20 per min. HR > 90   aa5 

      bpm. Yes Does the patient have a suspected source of infection? No. Patient's initial       

      sepsis screen is negative.                                                                  

                                                                                                  

Triage Assessment:                                                                                

13:42 General: Behavior is.                                                                   ca1 

                                                                                                  

OB/GYN:                                                                                           

11:03 LMP 10/17/2018                                                                          aa5 

                                                                                                  

Historical:                                                                                       

- Allergies:                                                                                      

11:03 No Known Allergies;                                                                     aa5 

- Home Meds:                                                                                      

11:03 Advair Diskus Inhl [Active]; Albuterol Inhl [Active]; Flovent Inhl [Active]; Albuterol  aa5 

      Nebulizer [Active];                                                                         

- PMHx:                                                                                           

11:03 Asthma;                                                                                 aa5 

- PSHx:                                                                                           

11:03 None;                                                                                   aa5 

                                                                                                  

- Immunization history:: Flu vaccine is up to date.                                               

- Ebola Screening: : No symptoms or risks identified at this time.                                

- Social history:: Smoking status: Patient/guardian denies using tobacco.                         

                                                                                                  

                                                                                                  

Screenin:30 Abuse screen: Denies threats or abuse. Nutritional screening: No deficits noted.        aa5 

      Tuberculosis screening: No symptoms or risk factors identified. Fall Risk None              

      identified.                                                                                 

                                                                                                  

Assessment:                                                                                       

11:08 General: Appears distressed, uncomfortable, Behavior is cooperative. Pain: Complains of aa5 

      pain in mid-sternal area Pain does not radiate. Pain currently is 6 out of 10 on a pain     

      scale. Quality of pain is described as pressure, Is continuous. Neuro: Level of             

      Consciousness is awake, alert, obeys commands, Oriented to person, place, time,             

      situation. Cardiovascular: Heart tones S1 S2 present Rhythm is regular. Respiratory:        

      Airway is patent Respiratory effort is even, labored, Respiratory pattern is tachypnea      

      Breath sounds with wheezes bilaterally. GI: No signs and/or symptoms were reported          

      involving the gastrointestinal system. : No signs and/or symptoms were reported           

      regarding the genitourinary system. EENT: No signs and/or symptoms were reported            

      regarding the EENT system. Derm: Skin is pink, warm \T\ dry. Musculoskeletal: Range of      

      motion: intact in all extremities.                                                          

11:29 Reassessment: Patient states feeling better. Pain: Pain currently is 4 out of 10 on a   aa5 

      pain scale. Neuro: Level of Consciousness is awake, alert, obeys commands, Oriented to      

      person, place, time, situation. Respiratory: Airway is patent Respiratory effort is         

      labored, Respiratory pattern is tachypnea. Derm: Skin is pink, warm \T\ dry.                

11:40 Reassessment: Patient is alert, oriented x 3, equal unlabored respirations, skin        aa5 

      warm/dry/pink. Pt states "my chest still feels pretty tight". Pt rates chest pressure       

      4/10 on a pain scale. NP notified .                                                         

12:44 Reassessment: Patient and/or family updated on plan of care and expected duration. Pain aa5 

      level reassessed. Patient is alert, oriented x 3, equal unlabored respirations, skin        

      warm/dry/pink.                                                                              

13:12 General: Appears in no apparent distress. Respiratory: Airway is patent Respiratory     ca1 

      effort is even, unlabored, Respiratory pattern is regular, symmetrical, Breath sounds       

      are clear in left posterior upper lobe and left posterior lower lobe Breath sounds with     

      wheezes in right posterior upper lobe.                                                      

13:30 Reassessment: Patient appears in no apparent distress at this time. Patient and/or      ca1 

      family updated on plan of care and expected duration. Pain level reassessed. Patient is     

      alert, oriented x 3, equal unlabored respirations, skin warm/dry/pink.                      

                                                                                                  

Vital Signs:                                                                                      

11:03  / 79; Pulse 128; Resp 30 S; Pulse Ox 90% on R/A;                                 aa5 

11:29 Pulse 110; Resp 26 S; Temp 99.3(TE); Pulse Ox 98% on Nebulizer Mask;                    aa5 

11:40  / 85; Pulse 128; Resp 22 S; Pulse Ox 95% on R/A;                                 aa5 

13:14  / 94; Pulse 118; Resp 23; Pulse Ox 99% on R/A;                                   ca1 

13:30  / 77; Pulse 118; Resp 31; Pulse Ox 97% on R/A;                                   ca1 

11:40 NP notified of VS                                                                       aa5 

                                                                                                  

Vitals:                                                                                           

12:07 Fetal Heart Tones 162.                                                                  ss  

                                                                                                  

ED Course:                                                                                        

10:56 Patient arrived in ED.                                                                  as  

11:06 Leah Carmen FNP-C is PHCP.                                                        kb  

11:06 Bart Angelo MD is Attending Physician.                                             kb  

11:06 Arm band placed on Patient placed in an exam room, on a stretcher.                      aa5 

11:06 Patient has correct armband on for positive identification. Bed in low position. Call   aa5 

      light in reach. Side rails up X 1.                                                          

11:12 Inserted saline lock: 20 gauge in right antecubital area, using aseptic technique.      aa5 

11:22 Gini Mckenzie RN is Primary Nurse.                                                   aa5 

11:24 Triage completed.                                                                       aa5 

13:00 Report given to Britt Roe RN.                                                        aa5 

13:41 No provider procedures requiring assistance completed. IV discontinued, intact,         ca1 

      bleeding controlled, No redness/swelling at site. Pressure dressing applied.                

                                                                                                  

Administered Medications:                                                                         

11:12 Drug: DuoNeb (3:1) (2.5 mg - 0.5 mg) 3 ml Route: Nebulizer;                             aa5 

11:34 Follow up: Response: No adverse reaction                                                aa5 

13:00 Follow up: Response: No adverse reaction; Marked relief of symptoms                     ca1 

11:15 Drug: Magnesium Sulfate 2 grams Route: IVPB; Infused Over: 2 hrs; Site: right           aa5 

      antecubital;                                                                                

11:40 Follow up: Response: No adverse reaction; IV Status: Infusion continued                 aa5 

13:00 Follow up: Response: No adverse reaction; IV Status: Completed infusion                 ca1 

11:15 Drug: predniSONE 40 mg Route: PO;                                                       aa5 

11:34 Follow up: Response: No adverse reaction                                                aa5 

13:00 Follow up: Response: No adverse reaction; Marked relief of symptoms                     ca1 

12:44 Drug: DuoNeb (3:1) (2.5 mg - 0.5 mg) 3 ml Route: Nebulizer;                             aa5 

13:00 Follow up: Response: No adverse reaction; Marked relief of symptoms                     ca1 

                                                                                                  

                                                                                                  

Outcome:                                                                                          

13:25 Discharge ordered by MD. tavarez  

13:41 Discharged to home ambulatory.                                                          ca1 

13:41 Condition: stable                                                                           

13:41 Discharge instructions given to patient, Instructed on discharge instructions, follow       

      up and referral plans. medication usage, Demonstrated understanding of instructions,        

      follow-up care, medications, Prescriptions given X 1.                                       

13:42 Patient left the ED.                                                                    ca1 

                                                                                                  

Signatures:                                                                                       

Leah Carmen, FNP-C                 FNP-Margarita Miles Audri, RN                     RN   aa5                                                  

Shayla Zavala RN                      RN   ss                                                   

Britt Roe RN                        RN   ca1                                                  

                                                                                                  

Corrections: (The following items were deleted from the chart)                                    

11:51 11:40 Reassessment: Patient is alert, oriented x 3, equal unlabored respirations, skin  aa5 

      warm/dry/pink. Pt states "my chest still feels pretty tight". Pt rates chest pressure       

      4/10 on a pain scale. . aa5                                                                 

                                                                                                  

**************************************************************************************************

## 2019-02-18 NOTE — XMS REPORT
Patient Summary Document

 Created on:2019



Patient:BRADLEY OGDEN

Sex:Female

:1990

External Reference #:[id number]





Demographics







 Address  [address] [place] [address]

 

 Home Phone  [phone]

 

 Email Address  NONE

 

 Preferred Language  Unknown

 

 Marital Status  Unknown

 

 [employer] Affiliation  Unknown

 

 Race  Unknown

 

 Additional Race(s)  Unavailable

 

 Ethnic Group  Unknown









Author







 Organization  [facility]

 

 Address  [address] [place] [address] [address]. [address] [place] [id number]

 

 Phone  [phone]









Care Team Providers







 Name  Role  Phone

 

 Unavailable  Unavailable  Unavailable









Problems

This patient has no known problems.



Allergies, Adverse Reactions, Alerts

This patient has no known allergies or adverse reactions.



Medications

This patient has no known medications.